# Patient Record
Sex: FEMALE | Race: WHITE | NOT HISPANIC OR LATINO | Employment: FULL TIME | URBAN - METROPOLITAN AREA
[De-identification: names, ages, dates, MRNs, and addresses within clinical notes are randomized per-mention and may not be internally consistent; named-entity substitution may affect disease eponyms.]

---

## 2020-01-03 ENCOUNTER — APPOINTMENT (OUTPATIENT)
Dept: RADIOLOGY | Facility: MEDICAL CENTER | Age: 30
End: 2020-01-03
Attending: EMERGENCY MEDICINE

## 2020-01-03 ENCOUNTER — HOSPITAL ENCOUNTER (EMERGENCY)
Facility: MEDICAL CENTER | Age: 30
End: 2020-01-03
Attending: EMERGENCY MEDICINE

## 2020-01-03 VITALS
DIASTOLIC BLOOD PRESSURE: 73 MMHG | RESPIRATION RATE: 14 BRPM | BODY MASS INDEX: 22.48 KG/M2 | WEIGHT: 122.14 LBS | SYSTOLIC BLOOD PRESSURE: 107 MMHG | HEIGHT: 62 IN | TEMPERATURE: 98.4 F | HEART RATE: 77 BPM | OXYGEN SATURATION: 99 %

## 2020-01-03 DIAGNOSIS — R19.00 ABDOMINAL WALL BULGE: ICD-10-CM

## 2020-01-03 PROCEDURE — 99284 EMERGENCY DEPT VISIT MOD MDM: CPT

## 2020-01-03 PROCEDURE — 76705 ECHO EXAM OF ABDOMEN: CPT

## 2020-01-03 NOTE — LETTER
"  FORM C-4:  EMPLOYEE’S CLAIM FOR COMPENSATION/ REPORT OF INITIAL TREATMENT  EMPLOYEE’S CLAIM - PROVIDE ALL INFORMATION REQUESTED   First Name Sharri Last Name Alexis Birthdate 1990  Sex female Claim Number   Home Employee Address [address]                                     Zip  [address] Height  1.575 m (5' 2\") Weight  55.4 kg (122 lb 2.2 oz) N  [id number]   Mailing Employee Address [address]               Zip  [address] Telephone  [phone] (home)  Primary Language Spoken   Insurer   Third Party   JAIME CLAIMS [employer] Employee's Occupation (Job Title) When Injury or Occupational Disease Occurred  [employer] 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 (if applicable) PUSHING BOXES    How did this injury or occupational disease occur? Be specific and answer in detail. Use additional sheet if necessary)  WHILE PUSHING BOXES DOWN THE LINE I FELT A TIGHTNESS IN MY STOMACH ABOVE MY BELLY BUTTON WHEN I FELT IT, IT HAD A BUMP.   If you believe that you have an occupational disease, when did you first have knowledge of the disability and it relationship to your employment? N/A Witnesses to the Accident  N/A   Nature of Injury or Occupational Disease  Workers' Compensation Part(s) of Body Injured or Affected  Abdomen Including Groin, N/A, N/A    I CERTIFY THAT THE ABOVE IS TRUE AND CORRECT TO THE BEST OF MY KNOWLEDGE AND THAT I HAVE PROVIDED THIS INFORMATION IN ORDER TO OBTAIN THE BENEFITS OF [place]).  I HEREBY AUTHORIZE ANY PHYSICIAN, CHIROPRACTOR, SURGEON, PRACTITIONER, OR OTHER PERSON, ANY HOSPITAL, INCLUDING [facility] OR [facility] H[facility]PITAL, ANY MEDICAL SERVICE ORGANIZATION, ANY INSURANCE COMPANY, OR OTHER INSTITUTION OR ORGANIZATION TO RELEASE TO EACH OTHER, ANY MEDICAL OR OTHER INFORMATION, INCLUDING BENEFITS PAID OR PAYABLE, PERTINENT TO THIS INJURY OR DISEASE, EXCEPT INFORMATION RELATIVE TO DIAGNOSIS, TREATMENT AND/OR COUNSELING FOR AIDS, PSYCHOLOGICAL CONDITIONS, ALCOHOL OR CONTROLLED SUBSTANCES, FOR WHICH I MUST GIVE SPECIFIC AUTHORIZATION.  A PHOTOSTAT OF THIS AUTHORIZATION SHALL BE AS VALID AS THE ORIGINAL.  Date    01/03/2020                                  [facility]                                                                             Employee’s Signature   THIS REPORT MUST BE COMPLETED AND MAILED WITHIN 3 WORKING DAYS OF TREATMENT   [facility], EMERGENCY DEPT                                                                             Name of Facility [facility]   Date  1/3/2020 Diagnosis  (R19.00) Abdominal wall bulge Is there evidence the injured employee was under the influence of alcohol and/or another controlled substance at the time of accident?   Hour  7:08 PM Description of Injury or Disease  Abdominal wall bulge No   Treatment  ultrasound  Have you advised the patient to remain off work five days or more?         No   X-Ray Findings  Negative If Yes   From Date    To Date      From information given by the employee, together with medical evidence, can you directly connect this injury or occupational disease as job incurred? No If No, is employee capable of: Full Duty  Yes Modified Duty      Is additional medical care by a physician indicated? No If Modified Duty, Specify any Limitations / Restrictions       Do you know of any  "previous injury or disease contributing to this condition or occupational disease? No    Date 1/3/2020 Print Doctor’s Name Antione Jeremi UNGER I certify the employer’s copy of this form was mailed on:   Address [address] [place] [address]  [place] [address] [phone] INSURER’S USE ONLY   Provider’s Tax ID Number [id number] Telephone Dept: [phone]    Doctor’s Signature odessa-JEREMI Pat M.D. Degree  MD      Form C-4 (rev.10/07)                                                                         BRIEF DESCRIPTION OF RIGHTS AND BENEFITS  (Pursuant to [place] 616C.050)    Notice of Injury or Occupational Disease (Incident Report Form C-1): If an injury or occupational disease (OD) arises out of and in the course of employment, you must provide written notice to your employer as soon as practicable, but no later than 7 days after the accident or OD. Your employer shall maintain a sufficient supply of the required forms.    Claim for Compensation (Form C-4): If medical treatment is sought, the form C-4 is available at the place of initial treatment. A completed \"Claim for Compensation\" (Form C-4) must be filed within 90 days after an accident or OD. The treating physician or chiropractor must, within 3 working days after treatment, complete and mail to the employer, the employer's insurer and third-party , the Claim for Compensation.    Medical Treatment: If you require medical treatment for your on-the-job injury or OD, you may be required to select a physician or chiropractor from a list provided by your workers’ compensation insurer, if it has contracted with an Organization for Managed Care (MCO) or Preferred Provider Organization (PPO) or providers of health care. If your employer has not entered into a contract with an MCO or PPO, you may select a physician or chiropractor from the Panel of Physicians and Chiropractors. Any medical costs related to your industrial injury or OD will be paid by your " insurer.    Temporary Total Disability (TTD): If your doctor has certified that you are unable to work for a period of at least 5 consecutive days, or 5 cumulative days in a 20-day period, or places restrictions on you that your employer does not accommodate, you may be entitled to TTD compensation.    Temporary Partial Disability (TPD): If the wage you receive upon reemployment is less than the compensation for TTD to which you are entitled, the insurer may be required to pay you TPD compensation to make up the difference. TPD can only be paid for a maximum of 24 months.    Permanent Partial Disability (PPD): When your medical condition is stable and there is an indication of a PPD as a result of your injury or OD, within 30 days, your insurer must arrange for an evaluation by a rating physician or chiropractor to determine the degree of your PPD. The amount of your PPD award depends on the date of injury, the results of the PPD evaluation and your age and wage.    Permanent Total Disability (PTD): If you are medically certified by a treating physician or chiropractor as permanently and totally disabled and have been granted a PTD status by your insurer, you are entitled to receive monthly benefits not to exceed 66 2/3% of your average monthly wage. The amount of your PTD payments is subject to reduction if you previously received a PPD award.    Vocational Rehabilitation Services: You may be eligible for vocational rehabilitation services if you are unable to return to the job due to a permanent physical impairment or permanent restrictions as a result of your injury or occupational disease.    Transportation and Per Clifford Reimbursement: You may be eligible for travel expenses and per clifford associated with medical treatment.    Reopening: You may be able to reopen your claim if your condition worsens after claim closure.     Appeal Process: If you disagree with a written determination issued by the insurer or the  insurer does not respond to your request, you may appeal to the Department of Administration, , by following the instructions contained in your determination letter. You must appeal the determination within 70 days from the date of the determination letter at [address], [address], [place], [place] [address], or [address], [place], [place] [address]. If you disagree with the  decision, you may appeal to the Department of Administration, . You must file your appeal within 30 days from the date of the  decision letter at [address], [address], [place], [place] [address], or [address], [address], [place], [place] [address]. If you disagree with a decision of an , you may file a petition for judicial review with the District Court. You must do so within 30 days of the Appeal Officer’s decision. You may be represented by an  at your own expense or you may contact the [facility] for possible representation.    [place]  for Injured Workers (NAIW): If you disagree with a  decision, you may request that NAIW represent you without charge at an  Hearing. For information regarding denial of benefits, you may contact the [facility] at: [address], [address], [place] [address], [phone], or [address], [address], [place] [address], [phone]    To File a Complaint with the Division: If you wish to file a complaint with the  of the Division of Industrial Relations (DIR),  please contact the Workers’ Compensation Section, [address], [address], [place], [place] [address], telephone [phone], or [address], [address], [place], [place] [address], telephone [phone].    For assistance with Workers’ Compensation Issues: You may contact the Office of the Governor Consumer Health Assistance, [address], [place], [place] [address], Toll Free 1-599.520.7173, Web site: [email or web address] Township [email or web address] Memorial [email or web address].[email or web address]., E-mail [email or web address].  D-2 (rev. 06/18)              __________________________________________________________________                                    ____01/03/2020_____________            Employee Name / Signature                                                                                                                            Date

## 2020-01-04 NOTE — ED PROVIDER NOTES
"ED Provider Note    Scribed for Jeremi Talbot M.D. by Margarita Osborn. 1/3/2020,  5:47 PM.    Means of Arrival: Walk-in  History obtained from: Patient  History limited by: None    CHIEF COMPLAINT  Chief Complaint   Patient presents with   • Abdominal Pain     [facility]  Sharri Espinoza is a 29 y.o. female who presents to the Emergency Department for upper abdominal pain that began today. She states she was working and pushing boxes when her pain began. She is concerned for a possible hernia. Earlier in the day, her pain was in her left lower quadrant. She states that an EMT at work evaluated her and said he could feel a pulse above her belly button. She does not report alleviating factors.     REVIEW OF SYSTEMS  GASTROINTESTINAL:  Upper abdominal pain, Left lower quadrant pain.    See [facility] for further details.     PAST MEDICAL HISTORY  History reviewed. No pertinent past medical history.    FAMILY HISTORY  History reviewed. No pertinent family history.    SOCIAL HISTORY   reports that she has never smoked. She does not have any smokeless tobacco history on file. She reports previous alcohol use. She reports previous drug use.    SURGICAL HISTORY  History reviewed. No pertinent surgical history.    CURRENT MEDICATIONS  Home Medications     Reviewed by Li Hernandez R.N. (Registered Nurse) on 01/03/20 at 1721  Med List Status: Partial   Medication Last Dose Status        Patient Abad Taking any Medications                     ALLERGIES  No Known Allergies    PHYSICAL EXAM  VITAL SIGNS: /73   Pulse 77   Temp 36.9 °C (98.4 °F) (Temporal)   Resp 14   Ht 1.575 m (5' 2\")   Wt 55.4 kg (122 lb 2.2 oz)   SpO2 99%   BMI 22.34 kg/m²    Gen: alert, no acute distress  HENT: ATNC  Eyes: normal conjuctiva  Resp: No resipiratory distress.   CV: No JVD   Abd: Non-distended, nontender, small area of firm tissue adjacent to umbilicus  Extremities: No deformity    DIAGNOSTIC STUDIES / PROCEDURES "     RADIOLOGY  US-ABDOMEN LTD (SOFT TISSUE)   Final Result      No evidence of umbilical hernia.        The radiologist’s interpretation of all radiology studies have been reviewed by me.    COURSE & MEDICAL DECISION MAKING  Pertinent Labs & Imaging studies reviewed. (See chart for details)    5:47 PM Patient seen and examined at bedside. I told the patient that we will perform an ultrasound. Ordered for imaging to evaluate.     6:57 PM - Reviewed radiology results, which show no acute abnormalities.     7:03 PM - I reevaluated patient at bedside. I updated her on the findings and plan for discharge. I provided strict return precautions. The patient was given an opportunity to ask questions. She understands and agrees.     Medical Decision Making:  Patient presents with concern for possible buckle hernia.  Physical exam is reassuring.  She does have a small amount of tissue adjacent to her umbilicus, which is likely physiologic.  No obvious hernia on physical exam.  Ultrasound demonstrates no hernia.  Patient has no pain associated with this.  She will be discharged home to follow-up with her regular doctor.  Abdomen is benign.     The patient will return for new or worsening symptoms and is stable at the time of discharge.    The patient is referred to a primary physician for blood pressure management, diabetic screening, and for all other preventative health concerns.    DISPOSITION:  Patient will be discharged home in stable condition.    FOLLOW UP:  Your regular doctor        FINAL IMPRESSION  1. Abdominal wall bulge            Margarita HOPE ([place]), am scribing for, and in the presence of, Jeremi Talbot M.D..    Electronically signed by: Margarita Osborn ([place]), 1/3/2020    Jeremi HOPE M.D. personally performed the services described in this documentation, as scribed by Margarita Osborn in my presence, and it is both accurate and complete. E    The note accurately reflects work and decisions made  by me.  Jeremi Talbot  1/4/2020  12:20 AM

## 2020-01-04 NOTE — ED NOTES
Workmans comp completed by registation.   Pt discharged home. Explained discharge and medication instructions. Questions and comments addressed. Pt verbalized understanding of instructions. Pt advised to follow-up with PCP or return to ED for any new or worsening of symptoms. Pt is ambulating well and steady on feet. VS stable. Pt's friend at bedside and will be driving pt home.

## 2020-01-04 NOTE — ED TRIAGE NOTES
"Pt comes in reporting she was pushing something at work today and believes she now has a hernia. Pt denies pain stating \"a little uncomfortable\"  "

## 2020-01-04 NOTE — DISCHARGE INSTRUCTIONS
You were seen in the emergency department for abnormal bulge on your abdominal wall.  Your ultrasound was reassuring.  There is no evidence of hernia.  The cause of this is not clear, however does not appear to be dangerous.  Please follow-up with your regular doctor.    Please return to the emergency department or seek medical attention if you develop:  Fevers, vomiting, redness in the area, increasing swelling of your abdomen, any other new or concerning findings

## 2020-08-11 ENCOUNTER — OFFICE VISIT (OUTPATIENT)
Dept: URGENT CARE | Facility: PHYSICIAN GROUP | Age: 30
End: 2020-08-11
Payer: COMMERCIAL

## 2020-08-11 ENCOUNTER — HOSPITAL ENCOUNTER (OUTPATIENT)
Facility: MEDICAL CENTER | Age: 30
End: 2020-08-11
Attending: PHYSICIAN ASSISTANT
Payer: COMMERCIAL

## 2020-08-11 VITALS
DIASTOLIC BLOOD PRESSURE: 60 MMHG | TEMPERATURE: 97.5 F | HEART RATE: 74 BPM | HEIGHT: 62 IN | SYSTOLIC BLOOD PRESSURE: 92 MMHG | RESPIRATION RATE: 16 BRPM | BODY MASS INDEX: 23 KG/M2 | WEIGHT: 125 LBS | OXYGEN SATURATION: 96 %

## 2020-08-11 DIAGNOSIS — J02.9 SORE THROAT: ICD-10-CM

## 2020-08-11 LAB
COVID ORDER STATUS COVID19: NORMAL
INT CON NEG: NORMAL
INT CON POS: NORMAL
S PYO AG THROAT QL: NEGATIVE

## 2020-08-11 PROCEDURE — 99203 OFFICE O/P NEW LOW 30 MIN: CPT | Performed by: PHYSICIAN ASSISTANT

## 2020-08-11 PROCEDURE — 87880 STREP A ASSAY W/OPTIC: CPT | Performed by: PHYSICIAN ASSISTANT

## 2020-08-11 PROCEDURE — 99000 SPECIMEN HANDLING OFFICE-LAB: CPT | Performed by: PHYSICIAN ASSISTANT

## 2020-08-11 PROCEDURE — U0003 INFECTIOUS AGENT DETECTION BY NUCLEIC ACID (DNA OR RNA); SEVERE ACUTE RESPIRATORY SYNDROME CORONAVIRUS 2 (SARS-COV-2) (CORONAVIRUS DISEASE [COVID-19]), AMPLIFIED PROBE TECHNIQUE, MAKING USE OF HIGH THROUGHPUT TECHNOLOGIES AS DESCRIBED BY CMS-2020-01-R: HCPCS

## 2020-08-11 RX ORDER — SPIRONOLACTONE 50 MG/1
50 TABLET, FILM COATED ORAL DAILY
COMMUNITY

## 2020-08-11 ASSESSMENT — ENCOUNTER SYMPTOMS
SHORTNESS OF BREATH: 0
WHEEZING: 0
COUGH: 1
SPUTUM PRODUCTION: 0
PALPITATIONS: 0
HEMOPTYSIS: 0
SORE THROAT: 1
CHILLS: 0
FEVER: 0

## 2020-08-11 NOTE — PROGRESS NOTES
"Subjective:   Sharri Espinoza is a 29 y.o. female who presents for Pharyngitis (Congestion, runny nose, cough ongoing x 2 days)      Pharyngitis   This is a new problem. The current episode started in the past 7 days. The problem has been unchanged. Associated symptoms include congestion and coughing. Pertinent negatives include no ear pain or shortness of breath.       Review of Systems   Constitutional: Positive for malaise/fatigue. Negative for chills and fever.   HENT: Positive for congestion and sore throat. Negative for ear pain.    Respiratory: Positive for cough. Negative for hemoptysis, sputum production, shortness of breath and wheezing.    Cardiovascular: Negative for chest pain and palpitations.   All other systems reviewed and are negative.      Medications:    • sertraline Tabs  • spironolactone Tabs    Allergies: Patient has no known allergies.    Problem List: Sharri Espinoza does not have a problem list on file.    Surgical History:  No past surgical history on file.    Past Social Hx: Sharri Espinoza  reports that she has never smoked. She does not have any smokeless tobacco history on file. She reports previous alcohol use. She reports previous drug use.     Past Family Hx:  Sharri Espinoza family history is not on file.     Problem list, medications, and allergies reviewed by myself today in Epic.     Objective:     Blood Pressure (Abnormal) 92/60   Pulse 74   Temperature 36.4 °C (97.5 °F) (Temporal)   Respiration 16   Height 1.575 m (5' 2\")   Weight 56.7 kg (125 lb)   Last Menstrual Period 08/11/2020 (Exact Date)   Oxygen Saturation 96%   Body Mass Index 22.86 kg/m²     Physical Exam  Vitals signs reviewed.   Constitutional:       General: She is not in acute distress.     Appearance: She is well-developed. She is not ill-appearing or toxic-appearing.      Interventions: She is not intubated.  HENT:      Head: Normocephalic and atraumatic.      Right Ear: Hearing, " tympanic membrane, ear canal and external ear normal.      Left Ear: Hearing, tympanic membrane, ear canal and external ear normal.      Nose: Nose normal.      Mouth/Throat:      Pharynx: Uvula midline.   Eyes:      General: Lids are normal.      Conjunctiva/sclera: Conjunctivae normal.   Neck:      Musculoskeletal: Full passive range of motion without pain, normal range of motion and neck supple.   Cardiovascular:      Rate and Rhythm: Regular rhythm.      Heart sounds: Normal heart sounds, S1 normal and S2 normal. No murmur. No friction rub. No gallop.    Pulmonary:      Effort: Pulmonary effort is normal. No tachypnea, bradypnea, accessory muscle usage or respiratory distress. She is not intubated.      Breath sounds: Normal breath sounds. No stridor. No decreased breath sounds, wheezing, rhonchi or rales.   Chest:      Chest wall: No tenderness.   Musculoskeletal: Normal range of motion.   Skin:     General: Skin is warm and dry.   Neurological:      Mental Status: She is alert and oriented to person, place, and time.   Psychiatric:         Speech: Speech normal.         Behavior: Behavior normal.         Thought Content: Thought content normal.         Judgment: Judgment normal.         Assessment/Plan:     Medical Decision Making/Comments   Pt is a 29 yr old female who presents for evaluation of a sore throat.  Pt states she has had symptoms for 2 days with malaise and fatigue.  Pt denies red flag symptoms of stiff neck, unable to handle oral secretions, hot potato voice.  Vital signs normal.  Exam shows no abnormalities.  There is no posterior oral pharyngeal swelling with uvula deviation.  Pt has soft and supple neck with full ROM.  Rapid Strep is negative.  Likely viral etiology.      Modified [facility] Criteria:  1/5            Diagnosis and associated orders     1. Sore throat  POCT Rapid Strep A    COVID/SARS COV-2 PCR   - isolate for 24 hrs while pcr test is pending  - treat symptoms with OTC  medications             Differential diagnosis, natural history, supportive care, and indications for immediate follow-up discussed.    Advised the patient to follow-up with the primary care physician for recheck, reevaluation, and consideration of further management.    Please note that this dictation was created using voice recognition software. I have made a reasonable attempt to correct obvious errors, but I expect that there are errors of grammar and possibly content that I did not discover before finalizing the note.

## 2020-08-12 LAB
SARS-COV-2 RNA RESP QL NAA+PROBE: NOTDETECTED
SPECIMEN SOURCE: NORMAL